# Patient Record
Sex: MALE | Race: WHITE
[De-identification: names, ages, dates, MRNs, and addresses within clinical notes are randomized per-mention and may not be internally consistent; named-entity substitution may affect disease eponyms.]

---

## 2018-05-05 ENCOUNTER — HOSPITAL ENCOUNTER (EMERGENCY)
Dept: HOSPITAL 26 - MED | Age: 38
Discharge: HOME | End: 2018-05-05
Payer: SELF-PAY

## 2018-05-05 VITALS — SYSTOLIC BLOOD PRESSURE: 128 MMHG | DIASTOLIC BLOOD PRESSURE: 82 MMHG

## 2018-05-05 VITALS — SYSTOLIC BLOOD PRESSURE: 138 MMHG | DIASTOLIC BLOOD PRESSURE: 87 MMHG

## 2018-05-05 VITALS — WEIGHT: 214 LBS | BODY MASS INDEX: 31.7 KG/M2 | HEIGHT: 69 IN

## 2018-05-05 DIAGNOSIS — M25.531: Primary | ICD-10-CM

## 2018-05-05 NOTE — NUR
PATIENT PRESENTS TO ED WITH C/O RIGHT HAND SWELLING THIS AM; DENIES INJURY AND 
TRAUMA, DENIES FALL; STATES HE HAD MILD RIGHT WRIST PAIN A FEW DAYS AGO BUT HAS 
SINCE RESOLVED . VSS; PATIENT POSITIONED FOR COMFORT; HOB ELEVATED; BEDRAILS UP 
X2; BED DOWN. ER MD MADE AWARE OF PT STATUS.

## 2018-05-26 ENCOUNTER — HOSPITAL ENCOUNTER (EMERGENCY)
Dept: HOSPITAL 26 - MED | Age: 38
Discharge: HOME | End: 2018-05-26
Payer: SELF-PAY

## 2018-05-26 VITALS — SYSTOLIC BLOOD PRESSURE: 129 MMHG | DIASTOLIC BLOOD PRESSURE: 88 MMHG

## 2018-05-26 VITALS — BODY MASS INDEX: 31.1 KG/M2 | HEIGHT: 69 IN | WEIGHT: 210 LBS

## 2018-05-26 VITALS — SYSTOLIC BLOOD PRESSURE: 120 MMHG | DIASTOLIC BLOOD PRESSURE: 78 MMHG

## 2018-05-26 DIAGNOSIS — R10.9: Primary | ICD-10-CM

## 2018-05-26 LAB
ALBUMIN FLD-MCNC: 4.1 G/DL (ref 3.4–5)
ANION GAP SERPL CALCULATED.3IONS-SCNC: 14 MMOL/L (ref 8–16)
APPEARANCE UR: CLEAR
AST SERPL-CCNC: 15 U/L (ref 15–37)
BASOPHILS # BLD AUTO: 0 K/UL (ref 0–0.22)
BASOPHILS NFR BLD AUTO: 0.4 % (ref 0–2)
BILIRUB SERPL-MCNC: 1.1 MG/DL (ref 0–1)
BILIRUB UR QL STRIP: (no result)
BUN SERPL-MCNC: 6 MG/DL (ref 7–18)
CHLORIDE SERPL-SCNC: 101 MMOL/L (ref 98–107)
CO2 SERPL-SCNC: 27 MMOL/L (ref 21–32)
COLOR UR: (no result)
CREAT SERPL-MCNC: 1.2 MG/DL (ref 0.7–1.3)
EOSINOPHIL # BLD AUTO: 0.1 K/UL (ref 0–0.4)
EOSINOPHIL NFR BLD AUTO: 2.4 % (ref 0–4)
ERYTHROCYTE [DISTWIDTH] IN BLOOD BY AUTOMATED COUNT: 13.7 % (ref 11.6–13.7)
GFR SERPL CREATININE-BSD FRML MDRD: 87 ML/MIN (ref 90–?)
GLUCOSE SERPL-MCNC: 96 MG/DL (ref 74–106)
GLUCOSE UR STRIP-MCNC: NEGATIVE MG/DL
HCT VFR BLD AUTO: 46.7 % (ref 36–52)
HGB BLD-MCNC: 15.3 G/DL (ref 12–18)
HGB UR QL STRIP: NEGATIVE
LEUKOCYTE ESTERASE UR QL STRIP: NEGATIVE
LYMPHOCYTES # BLD AUTO: 1.9 K/UL (ref 2–11.5)
LYMPHOCYTES NFR BLD AUTO: 31.9 % (ref 20.5–51.1)
MCH RBC QN AUTO: 28 PG (ref 27–31)
MCHC RBC AUTO-ENTMCNC: 33 G/DL (ref 33–37)
MCV RBC AUTO: 86 FL (ref 80–94)
MONOCYTES # BLD AUTO: 0.3 K/UL (ref 0.8–1)
MONOCYTES NFR BLD AUTO: 4.8 % (ref 1.7–9.3)
NEUTROPHILS # BLD AUTO: 3.5 K/UL (ref 1.8–7.7)
NEUTROPHILS NFR BLD AUTO: 60.5 % (ref 42.2–75.2)
NITRITE UR QL STRIP: NEGATIVE
PH UR STRIP: 6.5 [PH] (ref 5–9)
PLATELET # BLD AUTO: 171 K/UL (ref 140–450)
POTASSIUM SERPL-SCNC: 4 MMOL/L (ref 3.5–5.1)
RBC # BLD AUTO: 5.43 MIL/UL (ref 4.2–6.1)
RBC #/AREA URNS HPF: (no result) /HPF (ref 0–5)
SODIUM SERPL-SCNC: 138 MMOL/L (ref 136–145)
WBC # BLD AUTO: 5.8 K/UL (ref 4.8–10.8)
WBC,URINE: (no result) /HPF (ref 0–5)

## 2018-05-26 PROCEDURE — 85025 COMPLETE CBC W/AUTO DIFF WBC: CPT

## 2018-05-26 PROCEDURE — 99285 EMERGENCY DEPT VISIT HI MDM: CPT

## 2018-05-26 PROCEDURE — 81001 URINALYSIS AUTO W/SCOPE: CPT

## 2018-05-26 PROCEDURE — 76705 ECHO EXAM OF ABDOMEN: CPT

## 2018-05-26 PROCEDURE — 36415 COLL VENOUS BLD VENIPUNCTURE: CPT

## 2018-05-26 PROCEDURE — 80053 COMPREHEN METABOLIC PANEL: CPT

## 2018-05-26 NOTE — NUR
Patient discharged with v/s stable. Written and verbal after care instructions 
given and explained. 

Patient alert, oriented and verbalized understanding of instructions. 
Ambulatory with steady gait. All questions addressed prior to discharge. ID 
band removed. Patient advised to follow up with PMD. Rx of NAPROSY given. 
Patient educated on indication of medication including possible reaction and 
side effects. Opportunity to ask questions provided and answered.

## 2018-05-26 NOTE — NUR
C/O INTERMITTENT NON RADIATING RT FLANK PAIN X 4 DAYS WITH DYSURIA, DENIES 
FEVERS/CHIILS.PT LOOKS A LITTLE BIT PALE. PT'S SON AT BEDSIDE. PT STATES PAIN 
COMES AND GOES, PT FEELS  GENRALIZED WEAKNESS . PT AWAKE, ALERT. ASSISTED PT TO 
BED, HOB ELEVATED 30 DEGREES WITH LOW BED POSITION. DR. LANDAVERDE MADE AWARE OF 
PT STATUS.